# Patient Record
(demographics unavailable — no encounter records)

---

## 2025-02-24 NOTE — REASON FOR VISIT
[Subsequent Evaluation] : a subsequent evaluation for [Hand Weakness] :  hand weakness [Dizziness] : dizziness [Anxiety] : anxiety [Confused or Disoriented] : no confusion [Memory Lapses or Loss] : no memory loss [Decr. Concentrating Ability] : no decrease in concentrating ability [Difficulty with Language] : no ~M difficulty with language [Facial Weakness] : no facial weakness [Arm Weakness] : no arm weakness [Leg Weakness] : no leg weakness [Poor Coordination] : good coordination [Numbness] : no numbness [Seizures] : no convulsions [Difficulty Walking] : no difficulty walking [Frequent Falls] : not falling [Depression] : no depression [Eye Pain] : no eye pain [Eyesight Problems] : no eyesight problems [Earache] : no earache [Loss Of Hearing] : no hearing loss [Chest Pain] : no chest pain [Palpitations] : no palpitations [Cough] : no cough [Constipation] : no constipation [Diarrhea] : no diarrhea [Dysuria] : no dysuria [de-identified] : Chronic hand weakness secondary to surgery [[email or web address]] : Occasional faint ringing in the years

## 2025-03-03 NOTE — HISTORY OF PRESENT ILLNESS
[de-identified] : 15 yo F with hx of mixed bilateral hearing loss, history of enlarged vestibular aqueduct of both ears presents for annual follow up. No noticeable change in hearing. Only wears hearing aids for one class daily - uses FM system at that time. No tinnitus, otalgia, otorrhea, ear infections, dizziness/vertigo or headaches related to hearing loss.

## 2025-03-03 NOTE — DATA REVIEWED
[de-identified] : Right: Mild to moderate mixed -1kHz rising to WNL 1.5-8kHz. Left: Mild to moderate -750 kHz rising to WNL 1-8kHz. Type A tymps AU

## 2025-03-03 NOTE — DATA REVIEWED
[de-identified] : Right: Mild to moderate mixed -1kHz rising to WNL 1.5-8kHz. Left: Mild to moderate -750 kHz rising to WNL 1-8kHz. Type A tymps AU

## 2025-03-03 NOTE — HISTORY OF PRESENT ILLNESS
[de-identified] : 15 yo F with hx of mixed bilateral hearing loss, history of enlarged vestibular aqueduct of both ears presents for annual follow up. No noticeable change in hearing. Only wears hearing aids for one class daily - uses FM system at that time. No tinnitus, otalgia, otorrhea, ear infections, dizziness/vertigo or headaches related to hearing loss.